# Patient Record
Sex: MALE | Race: WHITE | NOT HISPANIC OR LATINO | URBAN - METROPOLITAN AREA
[De-identification: names, ages, dates, MRNs, and addresses within clinical notes are randomized per-mention and may not be internally consistent; named-entity substitution may affect disease eponyms.]

---

## 2022-09-17 ENCOUNTER — HOSPITAL ENCOUNTER (EMERGENCY)
Facility: HOSPITAL | Age: 22
Discharge: HOME | End: 2022-09-17
Attending: EMERGENCY MEDICINE | Admitting: EMERGENCY MEDICINE

## 2022-09-17 ENCOUNTER — APPOINTMENT (OUTPATIENT)
Dept: ULTRASOUND IMAGING | Facility: HOSPITAL | Age: 22
End: 2022-09-17

## 2022-09-17 VITALS
TEMPERATURE: 98.8 F | RESPIRATION RATE: 18 BRPM | BODY MASS INDEX: 23.01 KG/M2 | WEIGHT: 188.93 LBS | SYSTOLIC BLOOD PRESSURE: 134 MMHG | HEIGHT: 76 IN | DIASTOLIC BLOOD PRESSURE: 80 MMHG | OXYGEN SATURATION: 98 % | HEART RATE: 93 BPM

## 2022-09-17 DIAGNOSIS — N44.2 TESTICULAR CYST: Primary | ICD-10-CM

## 2022-09-17 DIAGNOSIS — N50.812 PAIN IN LEFT TESTICLE: ICD-10-CM

## 2022-09-17 LAB
BILIRUB UR QL STRIP: NEGATIVE
C TRACH RRNA CVX QL NAA+PROBE: NOT DETECTED
CLARITY UR: CLEAR
COLOR UR: YELLOW
GLUCOSE UR STRIP-MCNC: NEGATIVE MG/DL
HGB UR QL STRIP.AUTO: NEGATIVE
KETONES UR QL STRIP: NEGATIVE
LEUKOCYTE ESTERASE UR QL STRIP.AUTO: NEGATIVE
N GONORRHOEA RRNA SPEC QL NAA+PROBE: NOT DETECTED
NITRITE UR QL STRIP: NEGATIVE
PH UR STRIP.AUTO: 7 [PH] (ref 5–8)
PROT UR QL STRIP: NEGATIVE
SP GR UR STRIP: 1.01 (ref 1–1.03)
UROBILINOGEN UR QL STRIP: NORMAL

## 2022-09-17 PROCEDURE — 87591 N.GONORRHOEAE DNA AMP PROB: CPT | Performed by: NURSE PRACTITIONER

## 2022-09-17 PROCEDURE — 93976 VASCULAR STUDY: CPT

## 2022-09-17 PROCEDURE — 81003 URINALYSIS AUTO W/O SCOPE: CPT | Performed by: NURSE PRACTITIONER

## 2022-09-17 PROCEDURE — 76870 US EXAM SCROTUM: CPT

## 2022-09-17 PROCEDURE — 87491 CHLMYD TRACH DNA AMP PROBE: CPT | Performed by: NURSE PRACTITIONER

## 2022-09-17 PROCEDURE — 99283 EMERGENCY DEPT VISIT LOW MDM: CPT

## 2022-09-17 NOTE — DISCHARGE INSTRUCTIONS
Use Tylenol and Motrin as needed for discomfort    Follow-up with urology for further evaluation of testicular cyst    Return if worse

## 2022-09-17 NOTE — ED NOTES
Sent from [facility] for testicular US due to bilateral testicle pain since last night.

## 2022-09-17 NOTE — ED PROVIDER NOTES
Subjective   History of Present Illness  Patient is complaining of left testicular pain he states that it was extremely bad earlier today and he went to the hospital at [place].  He states that it is now decreased in severity but he states that they did give him some pain medication at the hospital in [place].  He also reports that he has had a knot on his left testicle for about 10 years.  At this time he rates his pain a 3/10.  He states that nothing makes it better nothing makes it worse it is constant but it does wax and wane in intensity he has no dysuria urgency or frequency he has no worry for STD        Review of Systems   Constitutional: Negative for chills, fatigue and fever.   HENT: Negative for congestion, tinnitus and trouble swallowing.    Eyes: Negative for photophobia, discharge and redness.   Respiratory: Negative for cough and shortness of breath.    Cardiovascular: Negative for chest pain and palpitations.   Gastrointestinal: Negative for abdominal pain, diarrhea, nausea and vomiting.   Genitourinary: Positive for testicular pain. Negative for dysuria, frequency, penile discharge, penile pain, penile swelling, scrotal swelling and urgency.   Musculoskeletal: Negative for back pain, joint swelling and myalgias.   Skin: Negative for rash.   Neurological: Negative for dizziness and headaches.   Psychiatric/Behavioral: Negative for confusion.   All other systems reviewed and are negative.      History reviewed. No pertinent past medical history.    No Known Allergies    History reviewed. No pertinent surgical history.    History reviewed. No pertinent family history.    Social History     Socioeconomic History   • Marital status: Single           Objective   Physical Exam  Vitals reviewed. Exam conducted with a chaperone present.   Constitutional:       Appearance: He is well-developed.   HENT:      Head: Normocephalic and atraumatic.   Eyes:      Conjunctiva/sclera: Conjunctivae normal.      Pupils:  "Pupils are equal, round, and reactive to light.   Cardiovascular:      Rate and Rhythm: Normal rate and regular rhythm.      Heart sounds: Normal heart sounds.   Pulmonary:      Effort: Pulmonary effort is normal.      Breath sounds: Normal breath sounds.   Abdominal:      General: Bowel sounds are normal.      Palpations: Abdomen is soft.   Genitourinary:     Penis: Normal and uncircumcised.       Testes: Cremasteric reflex is present.         Right: Tenderness, swelling or testicular hydrocele not present. Right testis is descended. Cremasteric reflex is present.          Left: Tenderness present.      Epididymis:      Right: Normal.      Left: Tenderness present.   Musculoskeletal:         General: Normal range of motion.      Cervical back: Normal range of motion and neck supple.   Skin:     General: Skin is warm and dry.      Capillary Refill: Capillary refill takes less than 2 seconds.   Neurological:      General: No focal deficit present.      Mental Status: He is alert and oriented to person, place, and time.      GCS: GCS eye subscore is 4. GCS verbal subscore is 5. GCS motor subscore is 6.   Psychiatric:         Mood and Affect: Mood normal.         Behavior: Behavior normal.         Procedures           ED Course      /80 (BP Location: Left arm, Patient Position: Sitting)   Pulse 93   Temp 98.8 °F (37.1 °C) (Temporal)   Resp 18   Ht 193 cm (76\")   Wt 85.7 kg (188 lb 15 oz)   SpO2 98%   BMI 23.00 kg/m²   Labs Reviewed   URINALYSIS W/ CULTURE IF INDICATED - Normal    Narrative:     In absence of clinical symptoms, the presence of pyuria, bacteria, and/or nitrites on the urinalysis result does not correlate with infection.  Urine microscopic not indicated.   CHLAMYDIA TRACHOMATIS, NEISSERIA GONORRHOEAE, PCR     Medications - No data to display  US Scrotum & Testicles    Result Date: 9/17/2022   1.  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 2.  No evidence of testicular torsion.  " Electronically Signed By-Elijah Marquez MD On:9/17/2022 6:11 PM This report was finalized on [id number] by  Elijah Marquez MD.                                         MDM  Number of Diagnoses or Management Options  Pain in left testicle  Testicular cyst  Diagnosis management comments: Patient had above exam and urinalysis was found to be within normal limits.  The patient had a testicular ultrasound which showed a 3 mm left testicular cyst which she states is been there for 10 years.  He was advised that there was no torsion and no hydrocele or epididymitis and that he be discharged home I advised him to follow-up with Dr. Loomis the on-call urologist for further evaluation and treatment of testicular cyst as he is the appropriate age for testicular cancer.  He states his uncle also had testicular cancer.  He states he will follow-up with Dr. Loomis he will use Tylenol Motrin and he will return if worse he verbalized understood discharge instructions       Amount and/or Complexity of Data Reviewed  Clinical lab tests: reviewed  Tests in the radiology section of CPT®: reviewed    Risk of Complications, Morbidity, and/or Mortality  Presenting problems: high  Diagnostic procedures: high  Management options: high    Patient Progress  Patient progress: improved      Final diagnoses:   Testicular cyst   Pain in left testicle       ED Disposition  ED Disposition     ED Disposition   Discharge    Condition   Stable    Comment   --             Skip Loomis MD  [address] [place] [facility]  [phone]    Schedule an appointment as soon as possible for a visit in 3 days  If symptoms worsen, As needed         Medication List      No changes were made to your prescriptions during this visit.          Anna Shaffer, APRN  09/17/22 1918